# Patient Record
Sex: MALE | Race: ASIAN | NOT HISPANIC OR LATINO
[De-identification: names, ages, dates, MRNs, and addresses within clinical notes are randomized per-mention and may not be internally consistent; named-entity substitution may affect disease eponyms.]

---

## 2017-01-12 ENCOUNTER — APPOINTMENT (OUTPATIENT)
Dept: CARDIOLOGY | Facility: CLINIC | Age: 47
End: 2017-01-12

## 2017-01-31 ENCOUNTER — MEDICATION RENEWAL (OUTPATIENT)
Age: 47
End: 2017-01-31

## 2017-04-11 ENCOUNTER — NON-APPOINTMENT (OUTPATIENT)
Age: 47
End: 2017-04-11

## 2017-04-11 ENCOUNTER — APPOINTMENT (OUTPATIENT)
Dept: CARDIOLOGY | Facility: CLINIC | Age: 47
End: 2017-04-11

## 2017-04-11 VITALS
HEIGHT: 68 IN | BODY MASS INDEX: 27.13 KG/M2 | SYSTOLIC BLOOD PRESSURE: 124 MMHG | HEART RATE: 61 BPM | WEIGHT: 179 LBS | DIASTOLIC BLOOD PRESSURE: 80 MMHG

## 2017-07-11 ENCOUNTER — MEDICATION RENEWAL (OUTPATIENT)
Age: 47
End: 2017-07-11

## 2017-07-27 ENCOUNTER — APPOINTMENT (OUTPATIENT)
Dept: CARDIOLOGY | Facility: CLINIC | Age: 47
End: 2017-07-27
Payer: MEDICARE

## 2017-07-27 ENCOUNTER — NON-APPOINTMENT (OUTPATIENT)
Age: 47
End: 2017-07-27

## 2017-07-27 VITALS
BODY MASS INDEX: 26.67 KG/M2 | SYSTOLIC BLOOD PRESSURE: 126 MMHG | HEIGHT: 68 IN | WEIGHT: 176 LBS | DIASTOLIC BLOOD PRESSURE: 76 MMHG | HEART RATE: 49 BPM

## 2017-07-27 PROCEDURE — 99214 OFFICE O/P EST MOD 30 MIN: CPT

## 2017-07-27 PROCEDURE — 93000 ELECTROCARDIOGRAM COMPLETE: CPT

## 2017-09-06 ENCOUNTER — MEDICATION RENEWAL (OUTPATIENT)
Age: 47
End: 2017-09-06

## 2017-09-26 ENCOUNTER — APPOINTMENT (OUTPATIENT)
Dept: CARDIOLOGY | Facility: CLINIC | Age: 47
End: 2017-09-26
Payer: MEDICARE

## 2017-09-26 VITALS
DIASTOLIC BLOOD PRESSURE: 72 MMHG | WEIGHT: 175 LBS | HEIGHT: 68 IN | SYSTOLIC BLOOD PRESSURE: 124 MMHG | HEART RATE: 64 BPM | BODY MASS INDEX: 26.52 KG/M2

## 2017-09-26 PROCEDURE — 99214 OFFICE O/P EST MOD 30 MIN: CPT

## 2017-12-06 ENCOUNTER — APPOINTMENT (OUTPATIENT)
Dept: CARDIOLOGY | Facility: CLINIC | Age: 47
End: 2017-12-06
Payer: MEDICARE

## 2017-12-06 ENCOUNTER — NON-APPOINTMENT (OUTPATIENT)
Age: 47
End: 2017-12-06

## 2017-12-06 VITALS
WEIGHT: 315 LBS | HEART RATE: 77 BPM | OXYGEN SATURATION: 97 % | DIASTOLIC BLOOD PRESSURE: 74 MMHG | SYSTOLIC BLOOD PRESSURE: 110 MMHG | BODY MASS INDEX: 269.89 KG/M2

## 2017-12-06 DIAGNOSIS — Z86.79 PERSONAL HISTORY OF OTHER DISEASES OF THE CIRCULATORY SYSTEM: ICD-10-CM

## 2017-12-06 PROCEDURE — 93000 ELECTROCARDIOGRAM COMPLETE: CPT

## 2017-12-06 PROCEDURE — 99214 OFFICE O/P EST MOD 30 MIN: CPT

## 2018-01-25 ENCOUNTER — MEDICATION RENEWAL (OUTPATIENT)
Age: 48
End: 2018-01-25

## 2018-03-16 ENCOUNTER — NON-APPOINTMENT (OUTPATIENT)
Age: 48
End: 2018-03-16

## 2018-03-16 ENCOUNTER — APPOINTMENT (OUTPATIENT)
Dept: CARDIOLOGY | Facility: CLINIC | Age: 48
End: 2018-03-16
Payer: MEDICARE

## 2018-03-16 VITALS
BODY MASS INDEX: 27.28 KG/M2 | DIASTOLIC BLOOD PRESSURE: 70 MMHG | HEART RATE: 71 BPM | SYSTOLIC BLOOD PRESSURE: 118 MMHG | WEIGHT: 180 LBS | HEIGHT: 68 IN | OXYGEN SATURATION: 98 %

## 2018-03-16 PROCEDURE — 93000 ELECTROCARDIOGRAM COMPLETE: CPT

## 2018-06-07 ENCOUNTER — NON-APPOINTMENT (OUTPATIENT)
Age: 48
End: 2018-06-07

## 2018-06-07 ENCOUNTER — APPOINTMENT (OUTPATIENT)
Dept: CARDIOLOGY | Facility: CLINIC | Age: 48
End: 2018-06-07
Payer: MEDICARE

## 2018-06-07 VITALS
WEIGHT: 180 LBS | SYSTOLIC BLOOD PRESSURE: 126 MMHG | BODY MASS INDEX: 27.28 KG/M2 | HEART RATE: 70 BPM | OXYGEN SATURATION: 98 % | HEIGHT: 68 IN | DIASTOLIC BLOOD PRESSURE: 80 MMHG

## 2018-06-07 PROCEDURE — 99214 OFFICE O/P EST MOD 30 MIN: CPT

## 2018-06-07 PROCEDURE — 93000 ELECTROCARDIOGRAM COMPLETE: CPT

## 2018-06-07 PROCEDURE — 93306 TTE W/DOPPLER COMPLETE: CPT

## 2018-09-11 ENCOUNTER — APPOINTMENT (OUTPATIENT)
Dept: CARDIOLOGY | Facility: CLINIC | Age: 48
End: 2018-09-11
Payer: MEDICARE

## 2018-09-11 ENCOUNTER — NON-APPOINTMENT (OUTPATIENT)
Age: 48
End: 2018-09-11

## 2018-09-11 VITALS
HEIGHT: 68 IN | OXYGEN SATURATION: 98 % | WEIGHT: 180 LBS | BODY MASS INDEX: 27.28 KG/M2 | SYSTOLIC BLOOD PRESSURE: 118 MMHG | DIASTOLIC BLOOD PRESSURE: 78 MMHG | HEART RATE: 51 BPM

## 2018-09-11 PROCEDURE — 93000 ELECTROCARDIOGRAM COMPLETE: CPT

## 2018-09-11 PROCEDURE — 99214 OFFICE O/P EST MOD 30 MIN: CPT

## 2018-12-11 ENCOUNTER — NON-APPOINTMENT (OUTPATIENT)
Age: 48
End: 2018-12-11

## 2018-12-11 ENCOUNTER — APPOINTMENT (OUTPATIENT)
Dept: CARDIOLOGY | Facility: CLINIC | Age: 48
End: 2018-12-11
Payer: MEDICARE

## 2018-12-11 VITALS
WEIGHT: 175 LBS | HEART RATE: 60 BPM | RESPIRATION RATE: 14 BRPM | HEIGHT: 68 IN | BODY MASS INDEX: 26.52 KG/M2 | SYSTOLIC BLOOD PRESSURE: 148 MMHG | DIASTOLIC BLOOD PRESSURE: 76 MMHG

## 2018-12-11 VITALS
BODY MASS INDEX: 26.86 KG/M2 | HEIGHT: 68 IN | HEART RATE: 62 BPM | DIASTOLIC BLOOD PRESSURE: 76 MMHG | SYSTOLIC BLOOD PRESSURE: 148 MMHG | WEIGHT: 177.25 LBS | OXYGEN SATURATION: 100 %

## 2018-12-11 PROCEDURE — 99214 OFFICE O/P EST MOD 30 MIN: CPT

## 2018-12-11 PROCEDURE — 93000 ELECTROCARDIOGRAM COMPLETE: CPT

## 2018-12-11 RX ORDER — METOPROLOL TARTRATE 25 MG/1
25 TABLET, FILM COATED ORAL TWICE DAILY
Qty: 60 | Refills: 4 | Status: DISCONTINUED | COMMUNITY
Start: 1900-01-01 | End: 2018-12-11

## 2018-12-11 NOTE — REVIEW OF SYSTEMS
[Headache] : headache [Shortness Of Breath] : shortness of breath [Muscle Cramps] : muscle cramps [Negative] : Endocrine [Fever] : no fever [Chills] : no chills [Feeling Fatigued] : not feeling fatigued [Chest Pain] : no chest pain [Lower Ext Edema] : no extremity edema [Palpitations] : no palpitations [Joint Pain] : no joint pain [Joint Swelling] : no joint swelling [Limb Weakness (Paresis)] : no limb weakness [Easy Bleeding] : no tendency for easy bleeding [Easy Bruising] : no tendency for easy bruising

## 2018-12-11 NOTE — DISCUSSION/SUMMARY
[FreeTextEntry1] : In a summary Mr. Kaufman, Sham is a middle aged male with CAD s/p stent and shortness of breath on exertion, will get Pharmacological nuclear stress test to evaluate stent and rule out new blockages as patient can not walk on treadmill because of leg pain. Continue Aspirin. Further plan based on stress test results. Hypercholesterolemia, on Statins and Niacin. Hypertension, new, increased Metoprolol,to 25 mg orally twice daily. LDL goal less than 70 g/dL. Healthy lifestyle. Follow up in 3 months.

## 2018-12-11 NOTE — PHYSICAL EXAM
[General Appearance - Well Developed] : well developed [Normal Appearance] : normal appearance [Well Groomed] : well groomed [General Appearance - Well Nourished] : well nourished [General Appearance - In No Acute Distress] : no acute distress [Normal Conjunctiva] : the conjunctiva exhibited no abnormalities [Eyelids - No Xanthelasma] : the eyelids demonstrated no xanthelasmas [Normal Oral Mucosa] : normal oral mucosa [No Oral Pallor] : no oral pallor [Respiration, Rhythm And Depth] : normal respiratory rhythm and effort [Auscultation Breath Sounds / Voice Sounds] : lungs were clear to auscultation bilaterally [Heart Rate And Rhythm] : heart rate and rhythm were normal [Heart Sounds] : normal S1 and S2 [Arterial Pulses Normal] : the arterial pulses were normal [Edema] : no peripheral edema present [Veins - Varicosity Changes] : no varicosital changes were noted in the lower extremities [Bowel Sounds] : normal bowel sounds [Abdomen Soft] : soft [Nail Clubbing] : no clubbing of the fingernails [Cyanosis, Localized] : no localized cyanosis [Skin Color & Pigmentation] : normal skin color and pigmentation [Skin Turgor] : normal skin turgor [] : no rash [Oriented To Time, Place, And Person] : oriented to person, place, and time [Impaired Insight] : insight and judgment were intact [No Anxiety] : not feeling anxious [FreeTextEntry1] : uses cane some times for support.

## 2018-12-11 NOTE — HISTORY OF PRESENT ILLNESS
[FreeTextEntry1] : Cathleen Guaman is a 48 year old male with history of CAD s/p stent, hypercholesterolemia and Hypertension new comes for follow up visit. Denies any chest pain or palpitations. Complaining of mild shortness of breath on exertion which is relieved with rest in few minutes of few months duration. Walks with cane for support because of leg pain. Complaint to medications and diet. Follows up with PCP.

## 2018-12-20 ENCOUNTER — APPOINTMENT (OUTPATIENT)
Dept: CV DIAGNOSTICS | Facility: HOSPITAL | Age: 48
End: 2018-12-20
Payer: MEDICARE

## 2018-12-20 ENCOUNTER — OUTPATIENT (OUTPATIENT)
Dept: OUTPATIENT SERVICES | Facility: HOSPITAL | Age: 48
LOS: 1 days | End: 2018-12-20

## 2018-12-20 DIAGNOSIS — I25.10 ATHEROSCLEROTIC HEART DISEASE OF NATIVE CORONARY ARTERY WITHOUT ANGINA PECTORIS: ICD-10-CM

## 2018-12-20 DIAGNOSIS — T86.829 UNSPECIFIED COMPLICATION OF SKIN GRAFT (ALLOGRAFT) (AUTOGRAFT): Chronic | ICD-10-CM

## 2018-12-20 DIAGNOSIS — R06.02 SHORTNESS OF BREATH: ICD-10-CM

## 2018-12-20 DIAGNOSIS — Z98.89 OTHER SPECIFIED POSTPROCEDURAL STATES: Chronic | ICD-10-CM

## 2018-12-20 DIAGNOSIS — Z95.5 PRESENCE OF CORONARY ANGIOPLASTY IMPLANT AND GRAFT: Chronic | ICD-10-CM

## 2018-12-20 DIAGNOSIS — S85.309A UNSPECIFIED INJURY OF GREATER SAPHENOUS VEIN AT LOWER LEG LEVEL, UNSPECIFIED LEG, INITIAL ENCOUNTER: Chronic | ICD-10-CM

## 2018-12-20 PROCEDURE — 93018 CV STRESS TEST I&R ONLY: CPT | Mod: GC

## 2018-12-20 PROCEDURE — 93016 CV STRESS TEST SUPVJ ONLY: CPT | Mod: GC

## 2018-12-20 PROCEDURE — 78452 HT MUSCLE IMAGE SPECT MULT: CPT | Mod: 26

## 2019-03-12 ENCOUNTER — APPOINTMENT (OUTPATIENT)
Dept: CARDIOLOGY | Facility: CLINIC | Age: 49
End: 2019-03-12
Payer: MEDICARE

## 2019-03-12 VITALS
OXYGEN SATURATION: 97 % | HEART RATE: 65 BPM | BODY MASS INDEX: 26.52 KG/M2 | DIASTOLIC BLOOD PRESSURE: 88 MMHG | WEIGHT: 175 LBS | HEIGHT: 68 IN | SYSTOLIC BLOOD PRESSURE: 136 MMHG

## 2019-03-12 PROCEDURE — 99214 OFFICE O/P EST MOD 30 MIN: CPT

## 2019-03-12 NOTE — DISCUSSION/SUMMARY
[FreeTextEntry1] : In a summary Cathleen Guaman is a middle aged male with CAD s/p stent, continue ASpirin. Recent Nuclear stress test is negative for ischemia. Hypertension, Diastolic BP is high. Continue Metoprolol. Cut down on salt intake. Hypercholesterolemia, on statins and Niaspan. Healthy lifestyle. Follow up in 4 months.

## 2019-03-12 NOTE — HISTORY OF PRESENT ILLNESS
[FreeTextEntry1] : Cathleen Guaman is a 48 year old male with history of CAD s/p stent x 2, in 2013 and 2014 comes for follow up visit. Denies any chest pain or shortness of breath. Occasional palpitations. Compliant to medications and diet. Follows up with PCP. Walking regularly.

## 2019-07-09 ENCOUNTER — APPOINTMENT (OUTPATIENT)
Dept: CARDIOLOGY | Facility: CLINIC | Age: 49
End: 2019-07-09
Payer: MEDICARE

## 2019-07-09 ENCOUNTER — NON-APPOINTMENT (OUTPATIENT)
Age: 49
End: 2019-07-09

## 2019-07-09 VITALS
SYSTOLIC BLOOD PRESSURE: 130 MMHG | DIASTOLIC BLOOD PRESSURE: 88 MMHG | HEIGHT: 68 IN | HEART RATE: 65 BPM | BODY MASS INDEX: 26.67 KG/M2 | WEIGHT: 176 LBS | OXYGEN SATURATION: 96 %

## 2019-07-09 PROCEDURE — 99214 OFFICE O/P EST MOD 30 MIN: CPT

## 2019-07-09 PROCEDURE — 93000 ELECTROCARDIOGRAM COMPLETE: CPT

## 2019-07-09 NOTE — HISTORY OF PRESENT ILLNESS
[FreeTextEntry1] : Cathleen Guaman is a 49 year old male with history of CAD s/p stent, hypertension and hypercholesterolemia comes for follow up visit. Denies any chest pain or palpitations. Chronic mild on and off shortness of breath on exertion which is relieved with rest in few minutes and unchanged. Compliant to medications and diet.

## 2019-07-09 NOTE — DISCUSSION/SUMMARY
[FreeTextEntry1] : In a summary Cathleen Guaman is a middle aged male with CAD s/p stent, stable. Continue Aspirin. Hypertension, diastolic BP high. Cut down on salt intake and monitor BP at home. If BP continues to be high will adjust medications. Hyperlipidemia, continue statins and Niaspan. Heal;thy lifestyle. Follow up in 4 months.

## 2019-07-09 NOTE — REVIEW OF SYSTEMS
[Shortness Of Breath] : shortness of breath [Muscle Cramps] : muscle cramps [Negative] : Endocrine [Fever] : no fever [Chills] : no chills [Headache] : no headache [Chest Pain] : no chest pain [Feeling Fatigued] : not feeling fatigued [Lower Ext Edema] : no extremity edema [Palpitations] : no palpitations [Joint Pain] : no joint pain [Joint Swelling] : no joint swelling [Easy Bleeding] : no tendency for easy bleeding [Limb Weakness (Paresis)] : no limb weakness [Easy Bruising] : no tendency for easy bruising

## 2019-07-09 NOTE — PHYSICAL EXAM
[General Appearance - Well Developed] : well developed [Normal Appearance] : normal appearance [Well Groomed] : well groomed [General Appearance - Well Nourished] : well nourished [General Appearance - In No Acute Distress] : no acute distress [Normal Conjunctiva] : the conjunctiva exhibited no abnormalities [Eyelids - No Xanthelasma] : the eyelids demonstrated no xanthelasmas [Normal Oral Mucosa] : normal oral mucosa [No Oral Pallor] : no oral pallor [Respiration, Rhythm And Depth] : normal respiratory rhythm and effort [Heart Rate And Rhythm] : heart rate and rhythm were normal [Heart Sounds] : normal S1 and S2 [Auscultation Breath Sounds / Voice Sounds] : lungs were clear to auscultation bilaterally [Arterial Pulses Normal] : the arterial pulses were normal [Edema] : no peripheral edema present [Veins - Varicosity Changes] : no varicosital changes were noted in the lower extremities [Bowel Sounds] : normal bowel sounds [Abdomen Soft] : soft [Nail Clubbing] : no clubbing of the fingernails [Cyanosis, Localized] : no localized cyanosis [Skin Color & Pigmentation] : normal skin color and pigmentation [Oriented To Time, Place, And Person] : oriented to person, place, and time [Skin Turgor] : normal skin turgor [] : no rash [Impaired Insight] : insight and judgment were intact [No Anxiety] : not feeling anxious [FreeTextEntry1] : uses cane some times for support.

## 2019-08-29 ENCOUNTER — MEDICATION RENEWAL (OUTPATIENT)
Age: 49
End: 2019-08-29

## 2019-10-29 ENCOUNTER — MEDICATION RENEWAL (OUTPATIENT)
Age: 49
End: 2019-10-29

## 2019-11-06 ENCOUNTER — APPOINTMENT (OUTPATIENT)
Dept: CARDIOLOGY | Facility: CLINIC | Age: 49
End: 2019-11-06
Payer: MEDICARE

## 2019-11-06 VITALS
SYSTOLIC BLOOD PRESSURE: 130 MMHG | HEIGHT: 68 IN | HEART RATE: 73 BPM | BODY MASS INDEX: 26.67 KG/M2 | DIASTOLIC BLOOD PRESSURE: 88 MMHG | OXYGEN SATURATION: 98 % | WEIGHT: 176 LBS

## 2019-11-06 PROCEDURE — 93306 TTE W/DOPPLER COMPLETE: CPT

## 2019-11-06 PROCEDURE — 99214 OFFICE O/P EST MOD 30 MIN: CPT

## 2019-11-06 NOTE — HISTORY OF PRESENT ILLNESS
[FreeTextEntry1] : Cathleen Guaman is a 49 year old male with history of CAD s/p stent, hypertension and hypercholesterolemia comes for follow up visit. Denies any chest pain or palpitations. No shortness of breath. Compliant to medications and diet. Follows up with PCP. Walks with cane because of leg pain.

## 2019-11-06 NOTE — REVIEW OF SYSTEMS
[Muscle Cramps] : muscle cramps [Negative] : Endocrine [Fever] : no fever [Headache] : no headache [Chills] : no chills [Feeling Fatigued] : not feeling fatigued [Shortness Of Breath] : no shortness of breath [Chest Pain] : no chest pain [Lower Ext Edema] : no extremity edema [Palpitations] : no palpitations [Joint Pain] : no joint pain [Joint Swelling] : no joint swelling [Limb Weakness (Paresis)] : no limb weakness [Easy Bleeding] : no tendency for easy bleeding [Easy Bruising] : no tendency for easy bruising

## 2019-11-06 NOTE — DISCUSSION/SUMMARY
[FreeTextEntry1] : In a summary Cathleen Guaman is a middle aged male with CAD s/p stent, stable. Continue Aspirin. Hypertension, on Metoprolol. Diastolic BP high. Cut down on salt intake. If BP continues to be high will adjust the dose. Hypercholesterolemia, on statins and low cholesterol diet. LDL goal less than 70 g/dL. Echo done showed normal LV systolic function. Healthy lifestyle. Follow up in 4 months.

## 2020-01-10 ENCOUNTER — MEDICATION RENEWAL (OUTPATIENT)
Age: 50
End: 2020-01-10

## 2020-03-11 ENCOUNTER — APPOINTMENT (OUTPATIENT)
Dept: CARDIOLOGY | Facility: CLINIC | Age: 50
End: 2020-03-11
Payer: MEDICARE

## 2020-03-11 ENCOUNTER — NON-APPOINTMENT (OUTPATIENT)
Age: 50
End: 2020-03-11

## 2020-03-11 VITALS
HEART RATE: 65 BPM | SYSTOLIC BLOOD PRESSURE: 130 MMHG | OXYGEN SATURATION: 98 % | DIASTOLIC BLOOD PRESSURE: 84 MMHG | HEIGHT: 68 IN | WEIGHT: 187 LBS | BODY MASS INDEX: 28.34 KG/M2

## 2020-03-11 PROCEDURE — 99214 OFFICE O/P EST MOD 30 MIN: CPT

## 2020-03-11 PROCEDURE — 93000 ELECTROCARDIOGRAM COMPLETE: CPT

## 2020-03-11 NOTE — DISCUSSION/SUMMARY
[FreeTextEntry1] : In a summary Cathleen Guaman is a middle aged male with CAD s/p stent, stable. Continue Aspirin. Hypertension, controlled. Continue current medications and 2 gm sodium diet. Hyperlipidemia, on statins and Niacin. Low cholesterol diet. Healthy lifestyle. Follow up in 3 months.

## 2020-03-11 NOTE — HISTORY OF PRESENT ILLNESS
[FreeTextEntry1] : Cathleen Guaman is a 49 year old male with history of CAD s/p stent, hypertension and hyperlipidemia comes for follow up visit. Denies any chest pain or palpitations. No shortness of breath on exertion. Physical activity is limited by leg pain. Compliant to medications and diet. Follows up with PCP.

## 2020-06-10 ENCOUNTER — APPOINTMENT (OUTPATIENT)
Dept: CARDIOLOGY | Facility: CLINIC | Age: 50
End: 2020-06-10

## 2020-12-01 ENCOUNTER — NON-APPOINTMENT (OUTPATIENT)
Age: 50
End: 2020-12-01

## 2020-12-01 ENCOUNTER — APPOINTMENT (OUTPATIENT)
Dept: CARDIOLOGY | Facility: CLINIC | Age: 50
End: 2020-12-01
Payer: MEDICARE

## 2020-12-01 VITALS
BODY MASS INDEX: 27.89 KG/M2 | TEMPERATURE: 97.6 F | SYSTOLIC BLOOD PRESSURE: 120 MMHG | OXYGEN SATURATION: 97 % | DIASTOLIC BLOOD PRESSURE: 78 MMHG | WEIGHT: 184 LBS | HEART RATE: 66 BPM | HEIGHT: 68 IN

## 2020-12-01 PROCEDURE — 99072 ADDL SUPL MATRL&STAF TM PHE: CPT

## 2020-12-01 PROCEDURE — 93000 ELECTROCARDIOGRAM COMPLETE: CPT

## 2020-12-01 PROCEDURE — 93306 TTE W/DOPPLER COMPLETE: CPT

## 2020-12-01 PROCEDURE — 99214 OFFICE O/P EST MOD 30 MIN: CPT

## 2020-12-01 NOTE — DISCUSSION/SUMMARY
[FreeTextEntry1] : In a summary Cathleen Guaman is a middle aged male with CAD S/P stent, stable. Asymptomatic cardiac wise. Hypertension, controlled. Continue current medications and 2 gm sodium diet. Hyperlipidemia, on medications and low Crab diet. LDL goal less than 70 g/dL. Echo done showed normal LV systolic function. With normal LV function and asymptomatic cardiac wise will be low risk cardiac wise for epidural injection. Explained Mr. Kaufman in detail. He had Epidural injection few years ago with out any complications. Healthy lifestyle. Follow up in 4 months.

## 2020-12-01 NOTE — HISTORY OF PRESENT ILLNESS
[FreeTextEntry1] : Cathleen Guaman is a 50 year old male with history of CAD s/p stent few years ago, hypertension and hyperlipidemia comes for follow up visit. He is getting Epidural injection for back pain. Denies any exertional chest pain or palpitations. No shortness of breath on exertion. Compliant to medications and diet. Follows up with PCP. Walks with cane.

## 2021-03-11 ENCOUNTER — NON-APPOINTMENT (OUTPATIENT)
Age: 51
End: 2021-03-11

## 2021-04-20 ENCOUNTER — APPOINTMENT (OUTPATIENT)
Dept: CARDIOLOGY | Facility: CLINIC | Age: 51
End: 2021-04-20
Payer: MEDICARE

## 2021-04-20 ENCOUNTER — NON-APPOINTMENT (OUTPATIENT)
Age: 51
End: 2021-04-20

## 2021-04-20 VITALS
OXYGEN SATURATION: 98 % | HEIGHT: 68 IN | HEART RATE: 72 BPM | BODY MASS INDEX: 28.34 KG/M2 | SYSTOLIC BLOOD PRESSURE: 122 MMHG | WEIGHT: 187 LBS | TEMPERATURE: 98.1 F | DIASTOLIC BLOOD PRESSURE: 82 MMHG

## 2021-04-20 PROCEDURE — 99214 OFFICE O/P EST MOD 30 MIN: CPT

## 2021-04-20 PROCEDURE — 99072 ADDL SUPL MATRL&STAF TM PHE: CPT

## 2021-04-20 PROCEDURE — 93000 ELECTROCARDIOGRAM COMPLETE: CPT

## 2021-04-20 NOTE — HISTORY OF PRESENT ILLNESS
[FreeTextEntry1] : Cathleen Guaman is a 50 year old male with history of CAD s/p stent few years ago, hypertension and hypercholesterolemia comes for follow up visit. Denies any chest pain or palpitations. No shortness of breath on exertion. Compliant to medications and diet. Received COVID vaccine. Follows up with PCP.

## 2021-04-20 NOTE — DISCUSSION/SUMMARY
[FreeTextEntry1] : In a summary Cathleen Guaman is a middle aged male with CAD s/p stent, stable. Continue Aspirin. Hypertension, controlled. Continue current medications and 2 gm sodium diet. Hypercholesterolemia, on statins and low cholesterol diet. LDL goal less than 70 g/dL. Continue healthy lifestyle. Follow up in 4 months.

## 2021-08-24 ENCOUNTER — NON-APPOINTMENT (OUTPATIENT)
Age: 51
End: 2021-08-24

## 2021-08-24 ENCOUNTER — APPOINTMENT (OUTPATIENT)
Dept: CARDIOLOGY | Facility: CLINIC | Age: 51
End: 2021-08-24
Payer: MEDICARE

## 2021-08-24 VITALS
HEIGHT: 68 IN | BODY MASS INDEX: 28.64 KG/M2 | HEART RATE: 87 BPM | WEIGHT: 189 LBS | OXYGEN SATURATION: 97 % | TEMPERATURE: 97.1 F | SYSTOLIC BLOOD PRESSURE: 118 MMHG | DIASTOLIC BLOOD PRESSURE: 74 MMHG

## 2021-08-24 PROCEDURE — 93000 ELECTROCARDIOGRAM COMPLETE: CPT

## 2021-08-24 PROCEDURE — 99214 OFFICE O/P EST MOD 30 MIN: CPT

## 2021-08-24 NOTE — HISTORY OF PRESENT ILLNESS
[FreeTextEntry1] : Cathleen Guaman is a 51 year old male with history of CAD s/p stents, hypertension and hypercholesterolemia comes for follow up visit. Denies any chest pain or shortness of breath. No palpitations. Started doing exercises and walking and feels better. Complaint to medications and diet. Follows up with PCP.

## 2021-08-24 NOTE — PHYSICAL EXAM
[Normal Conjunctiva] : normal conjunctiva [No Carotid Bruit] : no carotid bruit [Soft] : abdomen soft [Non Tender] : non-tender [Normal Bowel Sounds] : normal bowel sounds [No Edema] : no edema [No Cyanosis] : no cyanosis [Normal] : alert and oriented, normal memory [de-identified] : walks with cane.

## 2021-08-24 NOTE — DISCUSSION/SUMMARY
[FreeTextEntry1] : In a summary Cathleen Guaman is a middle aged male with CAD s/p stent, stable. Continue Aspirin. Hypertension, controlled. Continue current medication and 2 gm sodium diet. Hypercholesterolemia, on statins and low cholesterol diet. LDL goal less than 70 g/dL. Continue healthy lifestyle. Follow up in 4 months.

## 2021-09-30 ENCOUNTER — APPOINTMENT (OUTPATIENT)
Dept: UROLOGY | Facility: CLINIC | Age: 51
End: 2021-09-30

## 2021-10-12 ENCOUNTER — APPOINTMENT (OUTPATIENT)
Dept: UROLOGY | Facility: CLINIC | Age: 51
End: 2021-10-12

## 2021-10-13 ENCOUNTER — NON-APPOINTMENT (OUTPATIENT)
Age: 51
End: 2021-10-13

## 2021-10-13 ENCOUNTER — APPOINTMENT (OUTPATIENT)
Dept: CARDIOLOGY | Facility: CLINIC | Age: 51
End: 2021-10-13
Payer: MEDICARE

## 2021-10-13 VITALS
DIASTOLIC BLOOD PRESSURE: 76 MMHG | BODY MASS INDEX: 26.22 KG/M2 | WEIGHT: 173 LBS | HEIGHT: 68 IN | SYSTOLIC BLOOD PRESSURE: 112 MMHG | HEART RATE: 79 BPM | OXYGEN SATURATION: 95 %

## 2021-10-13 DIAGNOSIS — Z01.810 ENCOUNTER FOR PREPROCEDURAL CARDIOVASCULAR EXAMINATION: ICD-10-CM

## 2021-10-13 PROCEDURE — 99214 OFFICE O/P EST MOD 30 MIN: CPT

## 2021-10-13 PROCEDURE — 93000 ELECTROCARDIOGRAM COMPLETE: CPT | Mod: NC

## 2021-10-13 NOTE — REASON FOR VISIT
[Hypertension] : hypertension [Coronary Artery Disease] : coronary artery disease [Other: ____] : [unfilled]

## 2021-10-13 NOTE — DISCUSSION/SUMMARY
[FreeTextEntry1] : In a summary Cathleen Guaman is a middle aged male with CAD s/p stent, stable. Hypertension, controlled. Continue current medications and 2 gm sodium diet. Hypercholesterolemia, on statins and low cholesterol diet. Asymptomatic cardiac wise will be low risk for colonoscopy. Can stop Aspirin 1 week before colonoscopy and restart as soon as possible. Explained Mr. Kaufman in detail.

## 2021-10-13 NOTE — HISTORY OF PRESENT ILLNESS
[FreeTextEntry1] : Cathleen Guaman is a 51 year old male with CAD s/p stent, hypertension and Hypercholesterolemia comes for pre colonoscopy cardiovascular evaluation. Denies any chest pain or shortness of breath. No palpitations. Compliant to medications and diet. Follows up with PCP.

## 2021-10-13 NOTE — PHYSICAL EXAM
[Normal Conjunctiva] : normal conjunctiva [No Carotid Bruit] : no carotid bruit [Soft] : abdomen soft [Non Tender] : non-tender [Normal Bowel Sounds] : normal bowel sounds [No Edema] : no edema [No Cyanosis] : no cyanosis [Normal] : alert and oriented, normal memory [de-identified] : walks with cane.

## 2021-10-13 NOTE — REVIEW OF SYSTEMS
[Joint Pain] : joint pain [Negative] : Respiratory [Blurry Vision] : no blurred vision [Dyspnea on exertion] : not dyspnea during exertion [Chest Discomfort] : no chest discomfort [Lower Ext Edema] : no extremity edema [Palpitations] : no palpitations [Orthopnea] : no orthopnea [PND] : no PND [Abdominal Pain] : no abdominal pain [Nausea] : no nausea [Vomiting] : no vomiting [Heartburn] : no heartburn [Urinary Frequency] : no change in urinary frequency [Rash] : no rash [Itching] : no itching [Dizziness] : no dizziness [Tremor] : no tremor was seen [Numbness (Hypoesthesia)] : no numbness [Tingling (Paresthesia)] : no tingling [Confusion] : no confusion was observed [Anxiety] : no anxiety [Under Stress] : not under stress [Easy Bleeding] : no tendency for easy bleeding [Easy Bruising] : no tendency for easy bruising

## 2021-10-26 ENCOUNTER — APPOINTMENT (OUTPATIENT)
Dept: UROLOGY | Facility: CLINIC | Age: 51
End: 2021-10-26
Payer: MEDICARE

## 2021-10-26 VITALS
HEIGHT: 68 IN | DIASTOLIC BLOOD PRESSURE: 80 MMHG | RESPIRATION RATE: 14 BRPM | OXYGEN SATURATION: 96 % | WEIGHT: 173 LBS | HEART RATE: 81 BPM | SYSTOLIC BLOOD PRESSURE: 118 MMHG | BODY MASS INDEX: 26.22 KG/M2

## 2021-10-26 PROCEDURE — 99204 OFFICE O/P NEW MOD 45 MIN: CPT

## 2021-10-26 RX ORDER — SILDENAFIL 100 MG/1
100 TABLET, FILM COATED ORAL
Qty: 30 | Refills: 5 | Status: ACTIVE | COMMUNITY
Start: 2021-10-26 | End: 1900-01-01

## 2021-10-26 RX ORDER — TADALAFIL 20 MG/1
20 TABLET ORAL
Qty: 6 | Refills: 2 | Status: ACTIVE | COMMUNITY
Start: 2021-10-26 | End: 1900-01-01

## 2021-10-26 NOTE — HISTORY OF PRESENT ILLNESS
[FreeTextEntry1] : Cc ed\par Erectile Dysfunction\par Patient complains of erectile dysfunction. Onset of dysfunction was several years ago and was gradual in onset.  Patient states the nature of difficulty is both attaining and maintaining erection. Full erections occur never.. Libido is not affected. Risk factors for ED include antihypertensive medications, hyperlipid.cad- stent Patient denies history of  diabetes mellitus. Patient's expectations as to sexual function good.  . Previous treatment of ED includes none\par

## 2021-10-26 NOTE — ASSESSMENT
[FreeTextEntry1] : Discussed etiology and management of erectile dysfunction. We discussed that etiology of ED may be multifactorial. \par We discussed treatment options including oral medication with [id number] (Cialis, Viagra, etc), Vacuum erectile device pump (MERCEDES), intraurethral suppository (MUSE), cavernosal injection therapy (Caverject, Trimix, etc), and lastly, surgical options (penile prosthesis). \par risks and benefits of each reviewed\par Will try sildenafil side effects reviewed\par advised not to take with sublingual nitroglycerin\par More common reviewed- redness or warmth in your face, neck, or chest; cold symptoms such as stuffy nose, sneezing, or sore throat; headache; memory problems; diarrhea, upset stomach; or muscle pain, back pain.\par Stop immediately and got to ER for changes in vision or sudden vision loss; ringing in your ears, or sudden hearing loss; chest pain or heavy feeling, pain spreading to the arm or shoulder, nausea, sweating, general ill feeling; irregular heartbeat; shortness of breath, swelling in your hands or feet; seizure (convulsions); feeling light-headed, fainting; or penis erection that is painful or lasts 4 hours or longer\par

## 2021-12-14 ENCOUNTER — APPOINTMENT (OUTPATIENT)
Dept: CARDIOLOGY | Facility: CLINIC | Age: 51
End: 2021-12-14
Payer: MEDICARE

## 2021-12-14 VITALS
HEIGHT: 68 IN | DIASTOLIC BLOOD PRESSURE: 76 MMHG | BODY MASS INDEX: 27.74 KG/M2 | WEIGHT: 183 LBS | TEMPERATURE: 97.3 F | HEART RATE: 67 BPM | SYSTOLIC BLOOD PRESSURE: 114 MMHG | OXYGEN SATURATION: 98 %

## 2021-12-14 PROCEDURE — 93306 TTE W/DOPPLER COMPLETE: CPT

## 2021-12-14 PROCEDURE — 99214 OFFICE O/P EST MOD 30 MIN: CPT | Mod: 25

## 2021-12-14 NOTE — HISTORY OF PRESENT ILLNESS
[FreeTextEntry1] : Cathleen Guaman is a 51 year old male with CAD s/p stent, hypertension and Hypercholesterolemia comes for follow up visit. Denies any chest pain or shortness of breath. No palpitations. Compliant to medications and diet. Follows up with PCP.

## 2021-12-14 NOTE — PHYSICAL EXAM
[Normal Conjunctiva] : normal conjunctiva [No Carotid Bruit] : no carotid bruit [Soft] : abdomen soft [Non Tender] : non-tender [Normal Bowel Sounds] : normal bowel sounds [No Edema] : no edema [No Cyanosis] : no cyanosis [Normal] : alert and oriented, normal memory [de-identified] : walks with cane.

## 2021-12-14 NOTE — DISCUSSION/SUMMARY
[FreeTextEntry1] : In a summary Cathleen Guaman is a middle aged male with CAD s/p stent, stable. Hypertension, controlled. Continue current medications and 2 gm sodium diet. Hypercholesterolemia, on statins and low cholesterol diet. Echo done showed normal LV systolic function and wall motion. Echo results explained. Continue healthy lifestyle. Follow up in 4 months.

## 2022-04-12 ENCOUNTER — NON-APPOINTMENT (OUTPATIENT)
Age: 52
End: 2022-04-12

## 2022-04-12 ENCOUNTER — APPOINTMENT (OUTPATIENT)
Dept: CARDIOLOGY | Facility: CLINIC | Age: 52
End: 2022-04-12
Payer: MEDICARE

## 2022-04-12 VITALS
OXYGEN SATURATION: 97 % | HEART RATE: 82 BPM | SYSTOLIC BLOOD PRESSURE: 116 MMHG | TEMPERATURE: 98.7 F | WEIGHT: 194 LBS | HEIGHT: 68 IN | BODY MASS INDEX: 29.4 KG/M2 | DIASTOLIC BLOOD PRESSURE: 80 MMHG

## 2022-04-12 PROCEDURE — 93000 ELECTROCARDIOGRAM COMPLETE: CPT

## 2022-04-12 PROCEDURE — 99214 OFFICE O/P EST MOD 30 MIN: CPT

## 2022-04-12 NOTE — PHYSICAL EXAM
[Normal Conjunctiva] : normal conjunctiva [No Carotid Bruit] : no carotid bruit [Soft] : abdomen soft [Non Tender] : non-tender [Normal Bowel Sounds] : normal bowel sounds [No Edema] : no edema [No Cyanosis] : no cyanosis [Normal] : alert and oriented, normal memory [de-identified] : walks with cane.

## 2022-04-12 NOTE — DISCUSSION/SUMMARY
[FreeTextEntry1] : In a summary Cathleen Guaman is a middle aged male with CAD s/p stent, stable. continue Aspirin.  Hypertension, controlled. Continue Metoprolol and 2 gm sodium diet. Hypercholesterolemia, continue Atorvastatin , Niacin  and low cholesterol diet. LDL goal less than 70 g/dL. Continue healthy lifestyle. Follow up in 4 months.

## 2022-08-17 ENCOUNTER — NON-APPOINTMENT (OUTPATIENT)
Age: 52
End: 2022-08-17

## 2022-08-17 ENCOUNTER — APPOINTMENT (OUTPATIENT)
Dept: CARDIOLOGY | Facility: CLINIC | Age: 52
End: 2022-08-17

## 2022-08-17 VITALS — SYSTOLIC BLOOD PRESSURE: 140 MMHG | DIASTOLIC BLOOD PRESSURE: 86 MMHG

## 2022-08-17 VITALS
BODY MASS INDEX: 29.4 KG/M2 | WEIGHT: 194 LBS | HEIGHT: 68 IN | OXYGEN SATURATION: 97 % | HEART RATE: 74 BPM | DIASTOLIC BLOOD PRESSURE: 106 MMHG | SYSTOLIC BLOOD PRESSURE: 152 MMHG

## 2022-08-17 VITALS — DIASTOLIC BLOOD PRESSURE: 88 MMHG | SYSTOLIC BLOOD PRESSURE: 144 MMHG

## 2022-08-17 PROCEDURE — 93000 ELECTROCARDIOGRAM COMPLETE: CPT

## 2022-08-17 PROCEDURE — 99214 OFFICE O/P EST MOD 30 MIN: CPT | Mod: 25

## 2022-08-17 NOTE — DISCUSSION/SUMMARY
[FreeTextEntry1] : In a summary Cathleen Guaman is a middle aged male with CAD s/p stent, stable. continue Aspirin.  Hypertension, uncontrolled. Continue Metoprolol and 2 gm sodium diet. Started on Amlodipine 2.5 mg once daily. Monitor BP at home. Hypercholesterolemia, continue Atorvastatin , Niacin  and low cholesterol diet. LDL goal less than 70 g/dL. Continue healthy lifestyle. Follow up in 2 weeks.

## 2022-08-17 NOTE — PHYSICAL EXAM
[Normal Conjunctiva] : normal conjunctiva [No Carotid Bruit] : no carotid bruit [Soft] : abdomen soft [Non Tender] : non-tender [Normal Bowel Sounds] : normal bowel sounds [No Edema] : no edema [No Cyanosis] : no cyanosis [Normal] : alert and oriented, normal memory [de-identified] : walks with cane.

## 2022-08-17 NOTE — HISTORY OF PRESENT ILLNESS
[FreeTextEntry1] : Cathleen Guaman is a 51 year old male with CAD s/p stent, hypertension and Hypercholesterolemia comes for follow up visit. Denies any chest pain or shortness of breath. No palpitations. Compliant to medications and diet. Follows up with PCP. Says not feeling good.

## 2022-08-31 ENCOUNTER — APPOINTMENT (OUTPATIENT)
Dept: CARDIOLOGY | Facility: CLINIC | Age: 52
End: 2022-08-31

## 2022-12-13 ENCOUNTER — APPOINTMENT (OUTPATIENT)
Dept: UROLOGY | Facility: CLINIC | Age: 52
End: 2022-12-13

## 2022-12-13 VITALS
DIASTOLIC BLOOD PRESSURE: 88 MMHG | BODY MASS INDEX: 29.1 KG/M2 | SYSTOLIC BLOOD PRESSURE: 146 MMHG | WEIGHT: 192 LBS | HEIGHT: 68 IN

## 2022-12-13 DIAGNOSIS — N52.9 MALE ERECTILE DYSFUNCTION, UNSPECIFIED: ICD-10-CM

## 2022-12-13 PROCEDURE — 99214 OFFICE O/P EST MOD 30 MIN: CPT

## 2022-12-13 RX ORDER — SILDENAFIL 100 MG/1
100 TABLET, FILM COATED ORAL
Qty: 90 | Refills: 0 | Status: ACTIVE | COMMUNITY
Start: 2022-12-13 | End: 1900-01-01

## 2022-12-13 NOTE — HISTORY OF PRESENT ILLNESS
[FreeTextEntry1] : Cc ed\par Erectile Dysfunction\par Patient complains of erectile dysfunction. Onset of dysfunction was several years ago and was gradual in onset.  Patient states the nature of difficulty is both attaining and maintaining erection. Full erections occur never.. Libido is not affected. Risk factors for ED include antihypertensive medications, hyperlipid.cad- stent Patient denies history of  diabetes mellitus. Patient's expectations as to sexual function good.  . Previous treatment of ED includes sildenafil - good results \par

## 2023-02-07 NOTE — REVIEW OF SYSTEMS
Detail Level: Detailed [Joint Pain] : joint pain [Negative] : Respiratory [Blurry Vision] : no blurred vision [Dyspnea on exertion] : not dyspnea during exertion [Chest Discomfort] : no chest discomfort [Lower Ext Edema] : no extremity edema [Palpitations] : no palpitations [Orthopnea] : no orthopnea [PND] : no PND [Abdominal Pain] : no abdominal pain [Nausea] : no nausea [Vomiting] : no vomiting [Heartburn] : no heartburn [Urinary Frequency] : no change in urinary frequency [Rash] : no rash [Itching] : no itching [Dizziness] : no dizziness [Tremor] : no tremor was seen [Numbness (Hypoesthesia)] : no numbness [Tingling (Paresthesia)] : no tingling [Confusion] : no confusion was observed [Anxiety] : no anxiety [Under Stress] : not under stress [Easy Bleeding] : no tendency for easy bleeding [Easy Bruising] : no tendency for easy bruising

## 2023-02-16 ENCOUNTER — APPOINTMENT (OUTPATIENT)
Dept: CARDIOLOGY | Facility: CLINIC | Age: 53
End: 2023-02-16
Payer: MEDICARE

## 2023-02-16 ENCOUNTER — NON-APPOINTMENT (OUTPATIENT)
Age: 53
End: 2023-02-16

## 2023-02-16 VITALS
OXYGEN SATURATION: 97 % | DIASTOLIC BLOOD PRESSURE: 72 MMHG | WEIGHT: 192 LBS | SYSTOLIC BLOOD PRESSURE: 124 MMHG | HEIGHT: 68 IN | BODY MASS INDEX: 29.1 KG/M2 | HEART RATE: 65 BPM

## 2023-02-16 DIAGNOSIS — R07.89 OTHER CHEST PAIN: ICD-10-CM

## 2023-02-16 PROCEDURE — 93000 ELECTROCARDIOGRAM COMPLETE: CPT

## 2023-02-16 PROCEDURE — 99214 OFFICE O/P EST MOD 30 MIN: CPT | Mod: 25

## 2023-02-16 PROCEDURE — 93306 TTE W/DOPPLER COMPLETE: CPT

## 2023-02-16 NOTE — HISTORY OF PRESENT ILLNESS
[FreeTextEntry1] : Cathleen Guaman is a 52 year old male with CAD s/p stent, hypertension and Hypercholesterolemia comes for pre- op cardiac evaluation for spinal cord stimulator placement. Complaining of on and off chest tightness which is relieved with SL NTG IN few minutes of few weeks duration. Mild  shortness of breath of breath on exertion which is relieved with rest in few minutes.. No palpitations. Compliant to medications and diet. Follows up with PCP.

## 2023-02-16 NOTE — PHYSICAL EXAM
[Normal Conjunctiva] : normal conjunctiva [No Carotid Bruit] : no carotid bruit [Soft] : abdomen soft [Non Tender] : non-tender [Normal Bowel Sounds] : normal bowel sounds [No Edema] : no edema [No Cyanosis] : no cyanosis [Normal] : alert and oriented, normal memory [de-identified] : walks with cane.

## 2023-02-16 NOTE — DISCUSSION/SUMMARY
[FreeTextEntry1] : In a summary Cathleen Guaman is a middle aged male with CAD s/p stent,  continue Aspirin.  Hypertension, controlled. Continue Metoprolol, Amlodipine  and 2 gm sodium diet.  Hypercholesterolemia, continue Atorvastatin , Niacin  and low cholesterol diet. LDL goal less than 70 g/dL. Chest tightness, shortness of breath and pre- op, Echo done showed normal LV systolic function and wall motion. Pharmacological nuclear stress test to evaluate stents as he can not walk on Treadmill with back and leg problems. Further plan based on Stress test results. Continue healthy lifestyle. Follow up in 1 month.

## 2023-02-16 NOTE — REVIEW OF SYSTEMS
[Dyspnea on exertion] : dyspnea during exertion [Chest Discomfort] : chest discomfort [Joint Pain] : joint pain [Negative] : Respiratory [Blurry Vision] : no blurred vision [Lower Ext Edema] : no extremity edema [Palpitations] : no palpitations [Orthopnea] : no orthopnea [PND] : no PND [Abdominal Pain] : no abdominal pain [Nausea] : no nausea [Vomiting] : no vomiting [Heartburn] : no heartburn [Urinary Frequency] : no change in urinary frequency [Rash] : no rash [Itching] : no itching [Dizziness] : no dizziness [Tremor] : no tremor was seen [Numbness (Hypoesthesia)] : no numbness [Tingling (Paresthesia)] : no tingling [Confusion] : no confusion was observed [Anxiety] : no anxiety [Under Stress] : not under stress [Easy Bleeding] : no tendency for easy bleeding [Easy Bruising] : no tendency for easy bruising

## 2023-03-09 ENCOUNTER — APPOINTMENT (OUTPATIENT)
Dept: CV DIAGNOSTICS | Facility: HOSPITAL | Age: 53
End: 2023-03-09

## 2023-03-09 ENCOUNTER — OUTPATIENT (OUTPATIENT)
Dept: OUTPATIENT SERVICES | Facility: HOSPITAL | Age: 53
LOS: 1 days | End: 2023-03-09
Payer: MEDICARE

## 2023-03-09 DIAGNOSIS — T86.829 UNSPECIFIED COMPLICATION OF SKIN GRAFT (ALLOGRAFT) (AUTOGRAFT): Chronic | ICD-10-CM

## 2023-03-09 DIAGNOSIS — I25.10 ATHEROSCLEROTIC HEART DISEASE OF NATIVE CORONARY ARTERY WITHOUT ANGINA PECTORIS: ICD-10-CM

## 2023-03-09 DIAGNOSIS — S85.309A UNSPECIFIED INJURY OF GREATER SAPHENOUS VEIN AT LOWER LEG LEVEL, UNSPECIFIED LEG, INITIAL ENCOUNTER: Chronic | ICD-10-CM

## 2023-03-09 DIAGNOSIS — R07.89 OTHER CHEST PAIN: ICD-10-CM

## 2023-03-09 DIAGNOSIS — Z98.89 OTHER SPECIFIED POSTPROCEDURAL STATES: Chronic | ICD-10-CM

## 2023-03-09 DIAGNOSIS — Z95.5 PRESENCE OF CORONARY ANGIOPLASTY IMPLANT AND GRAFT: Chronic | ICD-10-CM

## 2023-03-09 PROCEDURE — 93016 CV STRESS TEST SUPVJ ONLY: CPT | Mod: GC

## 2023-03-09 PROCEDURE — 93018 CV STRESS TEST I&R ONLY: CPT | Mod: GC

## 2023-03-09 PROCEDURE — 78452 HT MUSCLE IMAGE SPECT MULT: CPT | Mod: 26,MH

## 2023-03-16 ENCOUNTER — APPOINTMENT (OUTPATIENT)
Dept: CARDIOLOGY | Facility: CLINIC | Age: 53
End: 2023-03-16
Payer: MEDICARE

## 2023-03-16 VITALS
HEART RATE: 80 BPM | DIASTOLIC BLOOD PRESSURE: 88 MMHG | TEMPERATURE: 97.1 F | HEIGHT: 68 IN | SYSTOLIC BLOOD PRESSURE: 126 MMHG | WEIGHT: 178 LBS | OXYGEN SATURATION: 97 % | BODY MASS INDEX: 26.98 KG/M2

## 2023-03-16 DIAGNOSIS — Z01.810 ENCOUNTER FOR PREPROCEDURAL CARDIOVASCULAR EXAMINATION: ICD-10-CM

## 2023-03-16 PROCEDURE — 99214 OFFICE O/P EST MOD 30 MIN: CPT

## 2023-03-16 NOTE — REVIEW OF SYSTEMS
[Dyspnea on exertion] : dyspnea during exertion [Joint Pain] : joint pain [Negative] : Respiratory [Blurry Vision] : no blurred vision [Chest Discomfort] : no chest discomfort [Lower Ext Edema] : no extremity edema [Palpitations] : no palpitations [Orthopnea] : no orthopnea [PND] : no PND [Abdominal Pain] : no abdominal pain [Nausea] : no nausea [Vomiting] : no vomiting [Heartburn] : no heartburn [Urinary Frequency] : no change in urinary frequency [Rash] : no rash [Itching] : no itching [Dizziness] : no dizziness [Tremor] : no tremor was seen [Numbness (Hypoesthesia)] : no numbness [Tingling (Paresthesia)] : no tingling [Confusion] : no confusion was observed [Anxiety] : no anxiety [Easy Bleeding] : no tendency for easy bleeding [Under Stress] : not under stress [Easy Bruising] : no tendency for easy bruising

## 2023-03-16 NOTE — DISCUSSION/SUMMARY
[FreeTextEntry1] : In a summary Mr. Kaufman Sham is a middle aged male with CAD s/p stent,  continue Aspirin.  Hypertension, controlled. Continue Metoprolol, Amlodipine  and 2 gm sodium diet.  Hypercholesterolemia, continue Atorvastatin , Niacin  and low cholesterol diet. LDL goal less than 70 g/dL. Pre- op cardiac evaluation for lumbar spinal cord stimulator , cardiac work up is negative for ischemia. He will be low risk cardiac wise for procedure. Explained Mr. Kaufman in detail.  Continue healthy lifestyle. Follow up in 6 months.

## 2023-03-16 NOTE — HISTORY OF PRESENT ILLNESS
[FreeTextEntry1] : Cathleen Guaman is a 52 year old male with CAD s/p stent, hypertension and Hypercholesterolemia comes for pre- op cardiac evaluation for spinal cord stimulator placement. On and off chest tightness is resolved.  Mild  shortness of breath of breath on exertion which is relieved with rest in few minutes and chronic.  No palpitations. Pharmacological nuclear stress test done on 3/9/2023 is negative for ischemia.  Compliant to medications and diet. Follows up with PCP.

## 2023-04-11 NOTE — REASON FOR VISIT
[Follow-Up - Clinic] : a clinic follow-up of [Coronary Artery Disease] : coronary artery disease [Hypertension] : hypertension 1 Principal Discharge DX:	Uterine fibroid  Secondary Diagnosis:	Lung nodule  Secondary Diagnosis:	Adrenal nodule

## 2023-09-14 ENCOUNTER — NON-APPOINTMENT (OUTPATIENT)
Age: 53
End: 2023-09-14

## 2023-09-14 ENCOUNTER — APPOINTMENT (OUTPATIENT)
Dept: CARDIOLOGY | Facility: CLINIC | Age: 53
End: 2023-09-14
Payer: MEDICARE

## 2023-09-14 VITALS
RESPIRATION RATE: 14 BRPM | SYSTOLIC BLOOD PRESSURE: 110 MMHG | TEMPERATURE: 98.2 F | DIASTOLIC BLOOD PRESSURE: 76 MMHG | WEIGHT: 189 LBS | HEART RATE: 71 BPM | HEIGHT: 68 IN | BODY MASS INDEX: 28.64 KG/M2 | OXYGEN SATURATION: 96 %

## 2023-09-14 PROCEDURE — 93000 ELECTROCARDIOGRAM COMPLETE: CPT

## 2023-09-14 PROCEDURE — 99214 OFFICE O/P EST MOD 30 MIN: CPT | Mod: 25

## 2023-09-14 RX ORDER — METOPROLOL SUCCINATE 25 MG/1
25 TABLET, EXTENDED RELEASE ORAL
Qty: 30 | Refills: 6 | Status: ACTIVE | COMMUNITY
Start: 2018-12-11 | End: 1900-01-01

## 2023-09-14 RX ORDER — ASPIRIN 81 MG/1
81 TABLET ORAL
Qty: 30 | Refills: 6 | Status: DISCONTINUED | COMMUNITY
Start: 2021-08-24 | End: 2023-09-14

## 2023-11-07 ENCOUNTER — RX RENEWAL (OUTPATIENT)
Age: 53
End: 2023-11-07

## 2024-01-01 NOTE — REVIEW OF SYSTEMS
[Joint Pain] : joint pain [Negative] : Respiratory [Blurry Vision] : no blurred vision [Dyspnea on exertion] : not dyspnea during exertion [Chest Discomfort] : no chest discomfort [Lower Ext Edema] : no extremity edema [Palpitations] : no palpitations [Orthopnea] : no orthopnea [PND] : no PND [Abdominal Pain] : no abdominal pain [Nausea] : no nausea [Vomiting] : no vomiting [Heartburn] : no heartburn [Urinary Frequency] : no change in urinary frequency [Rash] : no rash [Itching] : no itching [Dizziness] : no dizziness [Tremor] : no tremor was seen [Numbness (Hypoesthesia)] : no numbness [Tingling (Paresthesia)] : no tingling [Confusion] : no confusion was observed [Anxiety] : no anxiety [Under Stress] : not under stress [Easy Bleeding] : no tendency for easy bleeding [Easy Bruising] : no tendency for easy bruising Ampicillin

## 2024-01-25 ENCOUNTER — NON-APPOINTMENT (OUTPATIENT)
Age: 54
End: 2024-01-25

## 2024-01-25 ENCOUNTER — APPOINTMENT (OUTPATIENT)
Dept: CARDIOLOGY | Facility: CLINIC | Age: 54
End: 2024-01-25
Payer: MEDICARE

## 2024-01-25 VITALS
BODY MASS INDEX: 28.19 KG/M2 | OXYGEN SATURATION: 96 % | WEIGHT: 186 LBS | HEIGHT: 68 IN | HEART RATE: 69 BPM | RESPIRATION RATE: 14 BRPM | SYSTOLIC BLOOD PRESSURE: 100 MMHG | DIASTOLIC BLOOD PRESSURE: 66 MMHG | TEMPERATURE: 98.3 F

## 2024-01-25 DIAGNOSIS — R94.31 ABNORMAL ELECTROCARDIOGRAM [ECG] [EKG]: ICD-10-CM

## 2024-01-25 PROCEDURE — 99214 OFFICE O/P EST MOD 30 MIN: CPT | Mod: 25

## 2024-01-25 PROCEDURE — 93000 ELECTROCARDIOGRAM COMPLETE: CPT

## 2024-03-06 ENCOUNTER — RX RENEWAL (OUTPATIENT)
Age: 54
End: 2024-03-06

## 2024-03-06 RX ORDER — AMLODIPINE BESYLATE 2.5 MG/1
2.5 TABLET ORAL
Qty: 30 | Refills: 5 | Status: ACTIVE | COMMUNITY
Start: 2022-08-17 | End: 1900-01-01

## 2024-05-02 ENCOUNTER — APPOINTMENT (OUTPATIENT)
Dept: CARDIOLOGY | Facility: CLINIC | Age: 54
End: 2024-05-02
Payer: MEDICARE

## 2024-05-02 VITALS
RESPIRATION RATE: 14 BRPM | HEIGHT: 68 IN | TEMPERATURE: 98.2 F | HEART RATE: 71 BPM | WEIGHT: 186 LBS | BODY MASS INDEX: 28.19 KG/M2 | SYSTOLIC BLOOD PRESSURE: 124 MMHG | OXYGEN SATURATION: 97 % | DIASTOLIC BLOOD PRESSURE: 78 MMHG

## 2024-05-02 DIAGNOSIS — I10 ESSENTIAL (PRIMARY) HYPERTENSION: ICD-10-CM

## 2024-05-02 DIAGNOSIS — I25.10 ATHEROSCLEROTIC HEART DISEASE OF NATIVE CORONARY ARTERY W/OUT ANGINA PECTORIS: ICD-10-CM

## 2024-05-02 DIAGNOSIS — E78.00 PURE HYPERCHOLESTEROLEMIA, UNSPECIFIED: ICD-10-CM

## 2024-05-02 DIAGNOSIS — R06.02 SHORTNESS OF BREATH: ICD-10-CM

## 2024-05-02 PROCEDURE — 93306 TTE W/DOPPLER COMPLETE: CPT

## 2024-05-02 PROCEDURE — 99214 OFFICE O/P EST MOD 30 MIN: CPT

## 2024-05-02 NOTE — HISTORY OF PRESENT ILLNESS
[FreeTextEntry1] : Cathleen Guaman is a 53-year-old male with CAD s/p stent, hypertension and Hypercholesterolemia comes for follow up visit. Denies any chest pains.  Mild shortness of breath of breath on exertion which is relieved with rest in few minutes and chronic.  No palpitations. Pharmacological nuclear stress test done on 3/9/2023 is negative for ischemia.  Compliant to medications and diet. Follows up with PCP.

## 2024-05-02 NOTE — PHYSICAL EXAM
[Normal Conjunctiva] : normal conjunctiva [No Carotid Bruit] : no carotid bruit [Soft] : abdomen soft [Non Tender] : non-tender [Normal Bowel Sounds] : normal bowel sounds [No Edema] : no edema [No Cyanosis] : no cyanosis [Normal] : alert and oriented, normal memory [de-identified] : walks with cane.

## 2024-05-02 NOTE — REVIEW OF SYSTEMS
[Dyspnea on exertion] : dyspnea during exertion [Joint Pain] : joint pain [Negative] : Respiratory [Blurry Vision] : no blurred vision [Chest Discomfort] : no chest discomfort [Lower Ext Edema] : no extremity edema [Palpitations] : no palpitations [Orthopnea] : no orthopnea [PND] : no PND [Abdominal Pain] : no abdominal pain [Nausea] : no nausea [Vomiting] : no vomiting [Heartburn] : no heartburn [Urinary Frequency] : no change in urinary frequency [Rash] : no rash [Itching] : no itching [Dizziness] : no dizziness [Tremor] : no tremor was seen [Numbness (Hypoesthesia)] : no numbness [Tingling (Paresthesia)] : no tingling [Confusion] : no confusion was observed [Anxiety] : no anxiety [Under Stress] : not under stress [Easy Bleeding] : no tendency for easy bleeding [Easy Bruising] : no tendency for easy bruising

## 2024-05-02 NOTE — DISCUSSION/SUMMARY
[FreeTextEntry1] : In a summary Cathleen Guaman is a middle-aged male with CAD s/p stent, continue Aspirin.  Hypertension, controlled. Continue Metoprolol, Amlodipine and 2 gm sodium diet.  Hypercholesterolemia, continue Atorvastatin, Niacin and low cholesterol diet. LDL goal less than 70 g/dL. CAD and shortness of breath, Echo done showed normal LV systolic function. Echo results explained. Continue healthy lifestyle. Follow up in 4 months.

## 2024-06-19 RX ORDER — ASPIRIN ENTERIC COATED TABLETS 81 MG 81 MG/1
81 TABLET, DELAYED RELEASE ORAL
Qty: 30 | Refills: 6 | Status: ACTIVE | COMMUNITY
Start: 2023-09-14 | End: 1900-01-01

## 2024-09-11 ENCOUNTER — NON-APPOINTMENT (OUTPATIENT)
Age: 54
End: 2024-09-11

## 2024-09-11 ENCOUNTER — APPOINTMENT (OUTPATIENT)
Dept: CARDIOLOGY | Facility: CLINIC | Age: 54
End: 2024-09-11
Payer: MEDICARE

## 2024-09-11 VITALS
SYSTOLIC BLOOD PRESSURE: 136 MMHG | BODY MASS INDEX: 29.1 KG/M2 | HEART RATE: 55 BPM | HEIGHT: 68 IN | RESPIRATION RATE: 14 BRPM | TEMPERATURE: 97.7 F | DIASTOLIC BLOOD PRESSURE: 84 MMHG | OXYGEN SATURATION: 97 % | WEIGHT: 192 LBS

## 2024-09-11 DIAGNOSIS — I10 ESSENTIAL (PRIMARY) HYPERTENSION: ICD-10-CM

## 2024-09-11 DIAGNOSIS — E78.00 PURE HYPERCHOLESTEROLEMIA, UNSPECIFIED: ICD-10-CM

## 2024-09-11 DIAGNOSIS — I25.10 ATHEROSCLEROTIC HEART DISEASE OF NATIVE CORONARY ARTERY W/OUT ANGINA PECTORIS: ICD-10-CM

## 2024-09-11 PROCEDURE — 93000 ELECTROCARDIOGRAM COMPLETE: CPT

## 2024-09-11 PROCEDURE — 99214 OFFICE O/P EST MOD 30 MIN: CPT | Mod: 25

## 2024-09-11 NOTE — DISCUSSION/SUMMARY
[FreeTextEntry1] : In a summary Cathleen Guaman is a middle-aged male with CAD s/p stent, continue Aspirin.  Hypertension, controlled. Continue Metoprolol, Amlodipine and 2 gm sodium diet.  Hypercholesterolemia, continue Atorvastatin, Niacin and low cholesterol diet. LDL goal less than 70 g/dL. Continue healthy lifestyle. Follow up in 4 months.

## 2024-09-11 NOTE — HISTORY OF PRESENT ILLNESS
[FreeTextEntry1] : Cathleen Guaman is a 54-year-old male with CAD s/p stent, hypertension and Hypercholesterolemia comes for follow up visit. Denies any chest pains.  Mild shortness of breath of breath on exertion which is relieved with rest in few minutes and chronic.  No palpitations. Pharmacological nuclear stress test done on 3/9/2023 is negative for ischemia.  Compliant to medications and diet. Follows up with PCP.

## 2024-09-11 NOTE — PHYSICAL EXAM
[Normal Conjunctiva] : normal conjunctiva [No Carotid Bruit] : no carotid bruit [Soft] : abdomen soft [Non Tender] : non-tender [Normal Bowel Sounds] : normal bowel sounds [No Edema] : no edema [No Cyanosis] : no cyanosis [Normal] : alert and oriented, normal memory [de-identified] : walks with cane.

## 2024-10-04 ENCOUNTER — RX RENEWAL (OUTPATIENT)
Age: 54
End: 2024-10-04

## 2024-10-23 ENCOUNTER — RX RENEWAL (OUTPATIENT)
Age: 54
End: 2024-10-23

## 2024-10-28 ENCOUNTER — RX RENEWAL (OUTPATIENT)
Age: 54
End: 2024-10-28

## 2025-01-13 ENCOUNTER — APPOINTMENT (OUTPATIENT)
Dept: CARDIOLOGY | Facility: CLINIC | Age: 55
End: 2025-01-13
Payer: MEDICARE

## 2025-01-13 VITALS
HEART RATE: 73 BPM | HEIGHT: 68 IN | BODY MASS INDEX: 27.89 KG/M2 | DIASTOLIC BLOOD PRESSURE: 70 MMHG | SYSTOLIC BLOOD PRESSURE: 118 MMHG | RESPIRATION RATE: 14 BRPM | OXYGEN SATURATION: 96 % | WEIGHT: 184 LBS | TEMPERATURE: 98.5 F

## 2025-01-13 DIAGNOSIS — I10 ESSENTIAL (PRIMARY) HYPERTENSION: ICD-10-CM

## 2025-01-13 DIAGNOSIS — I25.10 ATHEROSCLEROTIC HEART DISEASE OF NATIVE CORONARY ARTERY W/OUT ANGINA PECTORIS: ICD-10-CM

## 2025-01-13 DIAGNOSIS — E78.00 PURE HYPERCHOLESTEROLEMIA, UNSPECIFIED: ICD-10-CM

## 2025-01-13 PROCEDURE — 99214 OFFICE O/P EST MOD 30 MIN: CPT

## 2025-03-12 ENCOUNTER — NON-APPOINTMENT (OUTPATIENT)
Age: 55
End: 2025-03-12

## 2025-03-12 ENCOUNTER — APPOINTMENT (OUTPATIENT)
Dept: CARDIOLOGY | Facility: CLINIC | Age: 55
End: 2025-03-12
Payer: MEDICARE

## 2025-03-12 VITALS
OXYGEN SATURATION: 97 % | BODY MASS INDEX: 28.79 KG/M2 | DIASTOLIC BLOOD PRESSURE: 81 MMHG | RESPIRATION RATE: 16 BRPM | TEMPERATURE: 97.2 F | HEART RATE: 76 BPM | WEIGHT: 190 LBS | HEIGHT: 68 IN | SYSTOLIC BLOOD PRESSURE: 136 MMHG

## 2025-03-12 DIAGNOSIS — E78.00 PURE HYPERCHOLESTEROLEMIA, UNSPECIFIED: ICD-10-CM

## 2025-03-12 DIAGNOSIS — Z01.810 ENCOUNTER FOR PREPROCEDURAL CARDIOVASCULAR EXAMINATION: ICD-10-CM

## 2025-03-12 DIAGNOSIS — I10 ESSENTIAL (PRIMARY) HYPERTENSION: ICD-10-CM

## 2025-03-12 DIAGNOSIS — I25.10 ATHEROSCLEROTIC HEART DISEASE OF NATIVE CORONARY ARTERY W/OUT ANGINA PECTORIS: ICD-10-CM

## 2025-03-12 PROCEDURE — 93000 ELECTROCARDIOGRAM COMPLETE: CPT | Mod: NC

## 2025-03-12 PROCEDURE — 99214 OFFICE O/P EST MOD 30 MIN: CPT | Mod: 25

## 2025-05-19 ENCOUNTER — RX RENEWAL (OUTPATIENT)
Age: 55
End: 2025-05-19

## 2025-06-16 ENCOUNTER — APPOINTMENT (OUTPATIENT)
Dept: CARDIOLOGY | Facility: CLINIC | Age: 55
End: 2025-06-16
Payer: MEDICARE

## 2025-06-16 ENCOUNTER — RESULT REVIEW (OUTPATIENT)
Age: 55
End: 2025-06-16

## 2025-06-16 VITALS
SYSTOLIC BLOOD PRESSURE: 125 MMHG | DIASTOLIC BLOOD PRESSURE: 79 MMHG | OXYGEN SATURATION: 98 % | BODY MASS INDEX: 28.95 KG/M2 | HEIGHT: 68 IN | RESPIRATION RATE: 16 BRPM | WEIGHT: 191 LBS | TEMPERATURE: 97.6 F | HEART RATE: 64 BPM

## 2025-06-16 PROCEDURE — 99214 OFFICE O/P EST MOD 30 MIN: CPT | Mod: 25

## 2025-06-16 PROCEDURE — 93306 TTE W/DOPPLER COMPLETE: CPT

## 2025-06-16 PROCEDURE — 93000 ELECTROCARDIOGRAM COMPLETE: CPT

## 2025-06-18 ENCOUNTER — RX RENEWAL (OUTPATIENT)
Age: 55
End: 2025-06-18

## 2025-06-20 ENCOUNTER — RX RENEWAL (OUTPATIENT)
Age: 55
End: 2025-06-20

## 2025-07-09 ENCOUNTER — RX RENEWAL (OUTPATIENT)
Age: 55
End: 2025-07-09